# Patient Record
Sex: MALE | Race: WHITE | NOT HISPANIC OR LATINO
[De-identification: names, ages, dates, MRNs, and addresses within clinical notes are randomized per-mention and may not be internally consistent; named-entity substitution may affect disease eponyms.]

---

## 2017-07-31 PROBLEM — Z00.00 ENCOUNTER FOR PREVENTIVE HEALTH EXAMINATION: Status: ACTIVE | Noted: 2017-07-31

## 2017-08-10 ENCOUNTER — NON-APPOINTMENT (OUTPATIENT)
Age: 82
End: 2017-08-10

## 2017-08-10 ENCOUNTER — APPOINTMENT (OUTPATIENT)
Dept: CARDIOLOGY | Facility: CLINIC | Age: 82
End: 2017-08-10
Payer: MEDICARE

## 2017-08-10 VITALS
SYSTOLIC BLOOD PRESSURE: 122 MMHG | OXYGEN SATURATION: 98 % | HEIGHT: 67 IN | BODY MASS INDEX: 39.87 KG/M2 | HEART RATE: 77 BPM | WEIGHT: 254 LBS | DIASTOLIC BLOOD PRESSURE: 75 MMHG

## 2017-08-10 PROCEDURE — 93000 ELECTROCARDIOGRAM COMPLETE: CPT

## 2017-08-10 PROCEDURE — 85610 PROTHROMBIN TIME: CPT | Mod: QW

## 2017-08-10 PROCEDURE — 99204 OFFICE O/P NEW MOD 45 MIN: CPT | Mod: 25

## 2017-08-10 RX ORDER — GABAPENTIN 600 MG/1
600 TABLET, COATED ORAL TWICE DAILY
Refills: 0 | Status: ACTIVE | COMMUNITY

## 2017-08-10 RX ORDER — METFORMIN HYDROCHLORIDE 500 MG/1
500 TABLET, FILM COATED, EXTENDED RELEASE ORAL
Refills: 0 | Status: ACTIVE | COMMUNITY

## 2017-08-10 RX ORDER — CITALOPRAM HYDROBROMIDE 40 MG/1
40 TABLET, FILM COATED ORAL DAILY
Refills: 0 | Status: ACTIVE | COMMUNITY

## 2017-08-10 RX ORDER — WARFARIN 5 MG/1
5 TABLET ORAL
Qty: 30 | Refills: 3 | Status: ACTIVE | COMMUNITY
Start: 2017-08-10 | End: 1900-01-01

## 2017-08-10 RX ORDER — TAMSULOSIN HYDROCHLORIDE 0.4 MG/1
0.4 CAPSULE ORAL
Refills: 0 | Status: ACTIVE | COMMUNITY

## 2017-08-10 RX ORDER — GLIPIZIDE 5 MG/1
5 TABLET, FILM COATED, EXTENDED RELEASE ORAL DAILY
Refills: 0 | Status: ACTIVE | COMMUNITY

## 2017-08-10 RX ORDER — ALPRAZOLAM 0.5 MG/1
0.5 TABLET ORAL
Refills: 0 | Status: DISCONTINUED | COMMUNITY
End: 2017-08-10

## 2017-08-15 LAB — INR PPP: 1.1 RATIO

## 2017-08-17 ENCOUNTER — APPOINTMENT (OUTPATIENT)
Dept: CARDIOLOGY | Facility: CLINIC | Age: 82
End: 2017-08-17
Payer: MEDICARE

## 2017-08-17 VITALS — OXYGEN SATURATION: 98 % | BODY MASS INDEX: 39.24 KG/M2 | HEIGHT: 67 IN | WEIGHT: 250 LBS | HEART RATE: 80 BPM

## 2017-08-17 LAB — INR PPP: 1.9 RATIO

## 2017-08-17 PROCEDURE — 85610 PROTHROMBIN TIME: CPT | Mod: QW

## 2017-08-17 PROCEDURE — 99211 OFF/OP EST MAY X REQ PHY/QHP: CPT

## 2017-08-17 RX ORDER — BLOOD SUGAR DIAGNOSTIC
STRIP MISCELLANEOUS
Qty: 100 | Refills: 2 | Status: ACTIVE | COMMUNITY
Start: 2017-08-17 | End: 1900-01-01

## 2017-09-18 ENCOUNTER — NON-APPOINTMENT (OUTPATIENT)
Age: 82
End: 2017-09-18

## 2017-09-18 ENCOUNTER — APPOINTMENT (OUTPATIENT)
Dept: CARDIOLOGY | Facility: CLINIC | Age: 82
End: 2017-09-18
Payer: MEDICARE

## 2017-09-18 VITALS
SYSTOLIC BLOOD PRESSURE: 100 MMHG | DIASTOLIC BLOOD PRESSURE: 60 MMHG | OXYGEN SATURATION: 98 % | BODY MASS INDEX: 40.02 KG/M2 | WEIGHT: 255 LBS | HEIGHT: 67 IN | HEART RATE: 86 BPM

## 2017-09-18 DIAGNOSIS — F41.9 ANXIETY DISORDER, UNSPECIFIED: ICD-10-CM

## 2017-09-18 PROCEDURE — 85610 PROTHROMBIN TIME: CPT | Mod: QW

## 2017-09-18 PROCEDURE — 99214 OFFICE O/P EST MOD 30 MIN: CPT

## 2017-09-18 PROCEDURE — 93000 ELECTROCARDIOGRAM COMPLETE: CPT

## 2017-09-20 LAB — INR PPP: 3.9 RATIO

## 2017-10-09 ENCOUNTER — APPOINTMENT (OUTPATIENT)
Dept: CARDIOLOGY | Facility: CLINIC | Age: 82
End: 2017-10-09

## 2017-10-25 ENCOUNTER — RX RENEWAL (OUTPATIENT)
Age: 82
End: 2017-10-25

## 2017-11-30 ENCOUNTER — MEDICATION RENEWAL (OUTPATIENT)
Age: 82
End: 2017-11-30

## 2017-12-01 ENCOUNTER — RX RENEWAL (OUTPATIENT)
Age: 82
End: 2017-12-01

## 2018-01-22 ENCOUNTER — RESULT REVIEW (OUTPATIENT)
Age: 83
End: 2018-01-22

## 2018-01-22 ENCOUNTER — RX RENEWAL (OUTPATIENT)
Age: 83
End: 2018-01-22

## 2018-02-23 ENCOUNTER — RX RENEWAL (OUTPATIENT)
Age: 83
End: 2018-02-23

## 2018-02-23 RX ORDER — ALPRAZOLAM 0.5 MG/1
0.5 TABLET ORAL
Qty: 30 | Refills: 0 | Status: ACTIVE | COMMUNITY
Start: 2017-08-10

## 2019-01-16 ENCOUNTER — APPOINTMENT (OUTPATIENT)
Dept: CARDIOLOGY | Facility: CLINIC | Age: 84
End: 2019-01-16
Payer: MEDICARE

## 2019-01-16 ENCOUNTER — NON-APPOINTMENT (OUTPATIENT)
Age: 84
End: 2019-01-16

## 2019-01-16 VITALS
HEART RATE: 68 BPM | WEIGHT: 250 LBS | SYSTOLIC BLOOD PRESSURE: 142 MMHG | OXYGEN SATURATION: 97 % | BODY MASS INDEX: 39.24 KG/M2 | DIASTOLIC BLOOD PRESSURE: 62 MMHG | HEIGHT: 67 IN

## 2019-01-16 DIAGNOSIS — I10 ESSENTIAL (PRIMARY) HYPERTENSION: ICD-10-CM

## 2019-01-16 PROCEDURE — 93306 TTE W/DOPPLER COMPLETE: CPT

## 2019-01-16 PROCEDURE — 93000 ELECTROCARDIOGRAM COMPLETE: CPT

## 2019-01-16 PROCEDURE — 99214 OFFICE O/P EST MOD 30 MIN: CPT

## 2019-01-16 RX ORDER — POTASSIUM CHLORIDE 1.5 G/1
20 POWDER, FOR SOLUTION ORAL DAILY
Refills: 0 | Status: DISCONTINUED | COMMUNITY
End: 2019-01-16

## 2019-01-16 RX ORDER — LISINOPRIL 10 MG/1
10 TABLET ORAL DAILY
Refills: 0 | Status: DISCONTINUED | COMMUNITY
End: 2019-01-16

## 2019-01-16 RX ORDER — ASPIRIN 81 MG/1
81 TABLET, CHEWABLE ORAL
Refills: 0 | Status: DISCONTINUED | COMMUNITY
End: 2019-01-16

## 2019-01-16 NOTE — REVIEW OF SYSTEMS
[Recent Weight Gain (___ Lbs)] : recent [unfilled] ~Ulb weight gain [Palpitations] : palpitations [Dizziness] : dizziness [Negative] : Heme/Lymph

## 2019-01-16 NOTE — DISCUSSION/SUMMARY
[___ Week(s)] : [unfilled] week(s) [FreeTextEntry3] : or sooner if needed. [FreeTextEntry1] : He will continue on atorvastatin for lipid-lowering along with Lasix for edema reduction and continue with his diabetic care medications. He will remain on warfarin with goal INR of 2-3 followed through your office for stroke risk reduction in the setting of chronic atrial fibrillation. I've ordered an echocardiogram to assess his current LV function and valvular status. We'll call him with his test results and he'll follow up with you for care. Age-appropriate activities and low-fat, low-cholesterol, low-salt diet with weight loss over time and carbohydrate restriction was encouraged.

## 2019-01-16 NOTE — HISTORY OF PRESENT ILLNESS
[FreeTextEntry1] : Saud is 89 years of age with obesity, dyslipidemia, hypertension, long-standing atrial fibrillation on warfarin and limited ambulation who follows up in the office feeling generally okay but has some exertional dyspnea at times. For leading generally healthy. He seemed to have a rate-controlled atrial fibrillation on his ECG today. I reviewed recent laboratories and his creatinine elevated slightly and lisinopril is no off his regimen. No current chest pains or palpitations noted.

## 2019-01-16 NOTE — PHYSICAL EXAM
[General Appearance - Well Developed] : well developed [Normal Appearance] : normal appearance [Well Groomed] : well groomed [General Appearance - Well Nourished] : well nourished [No Deformities] : no deformities [General Appearance - In No Acute Distress] : no acute distress [Normal Conjunctiva] : the conjunctiva exhibited no abnormalities [Eyelids - No Xanthelasma] : the eyelids demonstrated no xanthelasmas [Normal Oral Mucosa] : normal oral mucosa [No Oral Pallor] : no oral pallor [No Oral Cyanosis] : no oral cyanosis [Normal Jugular Venous A Waves Present] : normal jugular venous A waves present [Normal Jugular Venous V Waves Present] : normal jugular venous V waves present [No Jugular Venous Etienne A Waves] : no jugular venous etienne A waves [Respiration, Rhythm And Depth] : normal respiratory rhythm and effort [Exaggerated Use Of Accessory Muscles For Inspiration] : no accessory muscle use [Auscultation Breath Sounds / Voice Sounds] : lungs were clear to auscultation bilaterally [Heart Rate And Rhythm] : heart rate and rhythm were normal [Heart Sounds] : normal S1 and S2 [Murmurs] : no murmurs present [Edema] : no peripheral edema present [1+] : left 1+ [Bowel Sounds] : normal bowel sounds [Abdomen Soft] : soft [Abdomen Tenderness] : non-tender [Abdomen Mass (___ Cm)] : no abdominal mass palpated [Nail Clubbing] : no clubbing of the fingernails [Cyanosis, Localized] : no localized cyanosis [Petechial Hemorrhages (___cm)] : no petechial hemorrhages [Skin Color & Pigmentation] : normal skin color and pigmentation [] : no rash [No Venous Stasis] : no venous stasis [Skin Lesions] : no skin lesions [No Skin Ulcers] : no skin ulcer [No Xanthoma] : no  xanthoma was observed [Oriented To Time, Place, And Person] : oriented to person, place, and time [Affect] : the affect was normal [Mood] : the mood was normal [No Anxiety] : not feeling anxious [Right Carotid Bruit] : no bruit heard over the right carotid [Left Carotid Bruit] : no bruit heard over the left carotid [Bruit] : no bruit heard [FreeTextEntry1] : Walks with a cane

## 2019-01-16 NOTE — REASON FOR VISIT
[Follow-Up - Clinic] : a clinic follow-up of [Atrial Fibrillation] : atrial fibrillation [Dyspnea] : dyspnea [Hyperlipidemia] : hyperlipidemia [Hypertension] : hypertension

## 2020-02-05 ENCOUNTER — APPOINTMENT (OUTPATIENT)
Dept: CARDIOLOGY | Facility: CLINIC | Age: 85
End: 2020-02-05
Payer: MEDICARE

## 2020-02-05 ENCOUNTER — NON-APPOINTMENT (OUTPATIENT)
Age: 85
End: 2020-02-05

## 2020-02-05 VITALS
OXYGEN SATURATION: 94 % | HEART RATE: 59 BPM | BODY MASS INDEX: 40.18 KG/M2 | WEIGHT: 256 LBS | HEIGHT: 67 IN | DIASTOLIC BLOOD PRESSURE: 60 MMHG | SYSTOLIC BLOOD PRESSURE: 141 MMHG

## 2020-02-05 DIAGNOSIS — Z86.39 PERSONAL HISTORY OF OTHER ENDOCRINE, NUTRITIONAL AND METABOLIC DISEASE: ICD-10-CM

## 2020-02-05 PROCEDURE — 93225 XTRNL ECG REC<48 HRS REC: CPT

## 2020-02-05 PROCEDURE — 99215 OFFICE O/P EST HI 40 MIN: CPT

## 2020-02-05 PROCEDURE — 93306 TTE W/DOPPLER COMPLETE: CPT

## 2020-02-05 PROCEDURE — 93000 ELECTROCARDIOGRAM COMPLETE: CPT

## 2020-02-05 RX ORDER — AMLODIPINE BESYLATE 5 MG/1
5 TABLET ORAL DAILY
Qty: 90 | Refills: 3 | Status: ACTIVE | COMMUNITY
Start: 2020-02-05

## 2020-02-05 RX ORDER — PIOGLITAZONE HYDROCHLORIDE 15 MG/1
15 TABLET ORAL DAILY
Refills: 0 | Status: ACTIVE | COMMUNITY
Start: 2020-02-05

## 2020-02-05 NOTE — REASON FOR VISIT
[Follow-Up - Clinic] : a clinic follow-up of [Dyspnea] : dyspnea [Atrial Fibrillation] : atrial fibrillation [Hypertension] : hypertension [Hyperlipidemia] : hyperlipidemia

## 2020-02-05 NOTE — HISTORY OF PRESENT ILLNESS
[FreeTextEntry1] : Saud is 89 years of age with hypertension, atrial fibrillation, obesity, dyslipidemia, exertional dyspnea and went to [facility] recently where he stayed for several days with an unclear workup. He is here today with his adult daughter who helps try to explain what was done which sounds like blood work, x-ray and telemetry monitoring. Suggestion of sick sinus syndrome however he declined pursuing further testing is here for cardiac followup. Had adjustment of medication for ankle edema or he was given extra dosing of diuretic.

## 2020-02-05 NOTE — REVIEW OF SYSTEMS
[Recent Weight Gain (___ Lbs)] : recent [unfilled] ~Ulb weight gain [Dizziness] : dizziness [Palpitations] : palpitations [Negative] : Heme/Lymph

## 2020-02-05 NOTE — DISCUSSION/SUMMARY
[___ Week(s)] : [unfilled] week(s) [FreeTextEntry1] : He is advised  to continue his Lasix 40 mg daily along with his back on glycemic controlling medications antihypertensive medication management along with lipid lowering with a statin. To further cardiac risk stratify, I have ordered an echocardiogram to assess LV function and valvular status.I've ordered a 24-hour Holter monitor to assess for dysrhythmias including bradycardia arrhythmias or tachycardia arrhythmias.We will call the patient with test results and followup with you for general care.

## 2020-02-05 NOTE — PHYSICAL EXAM
[General Appearance - Well Developed] : well developed [Normal Appearance] : normal appearance [Well Groomed] : well groomed [No Deformities] : no deformities [General Appearance - Well Nourished] : well nourished [General Appearance - In No Acute Distress] : no acute distress [Normal Conjunctiva] : the conjunctiva exhibited no abnormalities [Eyelids - No Xanthelasma] : the eyelids demonstrated no xanthelasmas [No Oral Pallor] : no oral pallor [Normal Oral Mucosa] : normal oral mucosa [Normal Jugular Venous A Waves Present] : normal jugular venous A waves present [No Oral Cyanosis] : no oral cyanosis [Normal Jugular Venous V Waves Present] : normal jugular venous V waves present [No Jugular Venous Etienne A Waves] : no jugular venous etienne A waves [Respiration, Rhythm And Depth] : normal respiratory rhythm and effort [Auscultation Breath Sounds / Voice Sounds] : lungs were clear to auscultation bilaterally [Exaggerated Use Of Accessory Muscles For Inspiration] : no accessory muscle use [Murmurs] : no murmurs present [Heart Sounds] : normal S1 and S2 [1+] : left 1+ [Bowel Sounds] : normal bowel sounds [Abdomen Soft] : soft [Abdomen Mass (___ Cm)] : no abdominal mass palpated [Abdomen Tenderness] : non-tender [Nail Clubbing] : no clubbing of the fingernails [Cyanosis, Localized] : no localized cyanosis [Petechial Hemorrhages (___cm)] : no petechial hemorrhages [Skin Color & Pigmentation] : normal skin color and pigmentation [] : no rash [No Venous Stasis] : no venous stasis [Skin Lesions] : no skin lesions [No Skin Ulcers] : no skin ulcer [No Xanthoma] : no  xanthoma was observed [Oriented To Time, Place, And Person] : oriented to person, place, and time [Affect] : the affect was normal [Mood] : the mood was normal [No Anxiety] : not feeling anxious [Bradycardic ___] : the heart rate was bradycardic at [unfilled] bpm [Irregularly Irregular] : the rhythm was irregularly irregular [Right Carotid Bruit] : no bruit heard over the right carotid [Bruit] : no bruit heard [Left Carotid Bruit] : no bruit heard over the left carotid [FreeTextEntry1] : Walks with a cane

## 2020-02-14 ENCOUNTER — APPOINTMENT (OUTPATIENT)
Dept: ELECTROPHYSIOLOGY | Facility: CLINIC | Age: 85
End: 2020-02-14
Payer: MEDICARE

## 2020-02-14 ENCOUNTER — NON-APPOINTMENT (OUTPATIENT)
Age: 85
End: 2020-02-14

## 2020-02-14 VITALS
HEIGHT: 67 IN | SYSTOLIC BLOOD PRESSURE: 155 MMHG | WEIGHT: 256 LBS | DIASTOLIC BLOOD PRESSURE: 75 MMHG | OXYGEN SATURATION: 94 % | RESPIRATION RATE: 16 BRPM | HEART RATE: 67 BPM | BODY MASS INDEX: 40.18 KG/M2

## 2020-02-14 DIAGNOSIS — Z79.01 LONG TERM (CURRENT) USE OF ANTICOAGULANTS: ICD-10-CM

## 2020-02-14 DIAGNOSIS — I49.5 SICK SINUS SYNDROME: ICD-10-CM

## 2020-02-14 LAB
ALBUMIN SERPL ELPH-MCNC: 4 G/DL
ALP BLD-CCNC: 69 U/L
ALT SERPL-CCNC: 13 U/L
ANION GAP SERPL CALC-SCNC: 14 MMOL/L
AST SERPL-CCNC: 13 U/L
BASOPHILS # BLD AUTO: 0.01 K/UL
BASOPHILS NFR BLD AUTO: 0.2 %
BILIRUB SERPL-MCNC: 0.4 MG/DL
BUN SERPL-MCNC: 51 MG/DL
CALCIUM SERPL-MCNC: 9.5 MG/DL
CHLORIDE SERPL-SCNC: 102 MMOL/L
CO2 SERPL-SCNC: 25 MMOL/L
CREAT SERPL-MCNC: 1.68 MG/DL
EOSINOPHIL # BLD AUTO: 0.07 K/UL
EOSINOPHIL NFR BLD AUTO: 1.5 %
GLUCOSE SERPL-MCNC: 179 MG/DL
HCT VFR BLD CALC: 34.5 %
HGB BLD-MCNC: 10.4 G/DL
IMM GRANULOCYTES NFR BLD AUTO: 0.6 %
LYMPHOCYTES # BLD AUTO: 0.92 K/UL
LYMPHOCYTES NFR BLD AUTO: 19.9 %
MAN DIFF?: NORMAL
MCHC RBC-ENTMCNC: 27.8 PG
MCHC RBC-ENTMCNC: 30.1 GM/DL
MCV RBC AUTO: 92.2 FL
MONOCYTES # BLD AUTO: 0.52 K/UL
MONOCYTES NFR BLD AUTO: 11.2 %
NEUTROPHILS # BLD AUTO: 3.08 K/UL
NEUTROPHILS NFR BLD AUTO: 66.6 %
PLATELET # BLD AUTO: 175 K/UL
POTASSIUM SERPL-SCNC: 4.7 MMOL/L
PROT SERPL-MCNC: 7.3 G/DL
RBC # BLD: 3.74 M/UL
RBC # FLD: 17.2 %
SODIUM SERPL-SCNC: 141 MMOL/L
WBC # FLD AUTO: 4.63 K/UL

## 2020-02-14 PROCEDURE — 99205 OFFICE O/P NEW HI 60 MIN: CPT

## 2020-02-14 PROCEDURE — 93000 ELECTROCARDIOGRAM COMPLETE: CPT

## 2020-02-15 NOTE — PHYSICAL EXAM
[General Appearance - Well Developed] : well developed [Normal Appearance] : normal appearance [Well Groomed] : well groomed [General Appearance - Well Nourished] : well nourished [No Deformities] : no deformities [General Appearance - In No Acute Distress] : no acute distress [Normal Conjunctiva] : the conjunctiva exhibited no abnormalities [Eyelids - No Xanthelasma] : the eyelids demonstrated no xanthelasmas [Normal Oral Mucosa] : normal oral mucosa [No Oral Pallor] : no oral pallor [No Oral Cyanosis] : no oral cyanosis [Normal Jugular Venous A Waves Present] : normal jugular venous A waves present [Normal Jugular Venous V Waves Present] : normal jugular venous V waves present [No Jugular Venous Etienne A Waves] : no jugular venous etienne A waves [Heart Sounds] : normal S1 and S2 [Heart Rate And Rhythm] : heart rate and rhythm were normal [Murmurs] : no murmurs present [Respiration, Rhythm And Depth] : normal respiratory rhythm and effort [Exaggerated Use Of Accessory Muscles For Inspiration] : no accessory muscle use [Abdomen Soft] : soft [Abdomen Tenderness] : non-tender [Abdomen Mass (___ Cm)] : no abdominal mass palpated [Abnormal Walk] : normal gait [Nail Clubbing] : no clubbing of the fingernails [Gait - Sufficient For Exercise Testing] : the gait was sufficient for exercise testing [Petechial Hemorrhages (___cm)] : no petechial hemorrhages [Cyanosis, Localized] : no localized cyanosis [Skin Color & Pigmentation] : normal skin color and pigmentation [] : no rash [No Venous Stasis] : no venous stasis [Skin Lesions] : no skin lesions [No Skin Ulcers] : no skin ulcer [No Xanthoma] : no  xanthoma was observed [Mood] : the mood was normal [Oriented To Time, Place, And Person] : oriented to person, place, and time [Affect] : the affect was normal [No Anxiety] : not feeling anxious [FreeTextEntry1] : B/L diminished/crackles

## 2020-02-15 NOTE — HISTORY OF PRESENT ILLNESS
[FreeTextEntry1] : Ferny Quinteros MD \par \par Saud Benitez is a 89y/o man with Hx of HTN, HLD, BPH, DMII, and sick sinus syndrome, pending pacemaker implant, who presents today for initial evaluation. Has episodes of feeling dizzy and is often very tired. Was seen by his Cardiologist and noted to have marked bradycardia on EKG. Underwent Holter monitoring which revealed sick sinus syndrome. Denies chest pain, palpitations, SOB, syncope or near syncope.\par

## 2020-02-15 NOTE — DISCUSSION/SUMMARY
[FreeTextEntry1] : Saud Benitez is a 89y/o man with Hx of HTN, HLD, BPH, DMII, and sick sinus syndrome, pending pacemaker implant, who presents today for initial evaluation. \par \par Impression:\par \par 1. Sick sinus syndrome: EKG today sinus bradycardia with first degree AV delay and apc. Given sick sinus syndrome, recommend placement of dual chamber PPM. Risks, benefits, and alternatives discussed. \par \par 2. HTN: resume oral antihypertensives as prescribed. Encouraged heart healthy diet, sodium restriction, and weight loss. Continue regular f/u with Cardiologist for further HTN management.\par \par 3. HLD: resume statin therapy as prescribed and regular f/u with Cardiologist for routine lipid monitoring and management.\par \par Will f/u post pacemaker implant. \par \par Sincerely,\par \par Bernard Gonzalez MD

## 2020-02-18 LAB
INR PPP: 2.29 RATIO
PT BLD: 26.6 SEC

## 2020-02-27 ENCOUNTER — INPATIENT (INPATIENT)
Facility: HOSPITAL | Age: 85
LOS: 0 days | Discharge: HOME | End: 2020-02-28
Attending: INTERNAL MEDICINE | Admitting: INTERNAL MEDICINE
Payer: MEDICARE

## 2020-02-27 VITALS
SYSTOLIC BLOOD PRESSURE: 137 MMHG | RESPIRATION RATE: 16 BRPM | OXYGEN SATURATION: 96 % | DIASTOLIC BLOOD PRESSURE: 78 MMHG | HEART RATE: 101 BPM | TEMPERATURE: 98 F

## 2020-02-27 DIAGNOSIS — I49.5 SICK SINUS SYNDROME: ICD-10-CM

## 2020-02-27 LAB
GLUCOSE BLDC GLUCOMTR-MCNC: 136 MG/DL — HIGH (ref 70–99)
GLUCOSE BLDC GLUCOMTR-MCNC: 287 MG/DL — HIGH (ref 70–99)
INR BLD: 1.64 — HIGH (ref 0.88–1.17)
PROTHROM AB SERPL-ACNC: 19 SEC — HIGH (ref 9.8–13.1)

## 2020-02-27 PROCEDURE — 71045 X-RAY EXAM CHEST 1 VIEW: CPT | Mod: 26

## 2020-02-27 PROCEDURE — 93010 ELECTROCARDIOGRAM REPORT: CPT | Mod: 59

## 2020-02-27 PROCEDURE — 33208 INSRT HEART PM ATRIAL & VENT: CPT | Mod: KX

## 2020-02-27 RX ORDER — ATORVASTATIN CALCIUM 80 MG/1
20 TABLET, FILM COATED ORAL AT BEDTIME
Refills: 0 | Status: DISCONTINUED | OUTPATIENT
Start: 2020-02-27 | End: 2020-02-28

## 2020-02-27 RX ORDER — GABAPENTIN 400 MG/1
600 CAPSULE ORAL
Refills: 0 | Status: DISCONTINUED | OUTPATIENT
Start: 2020-02-27 | End: 2020-02-28

## 2020-02-27 RX ORDER — FUROSEMIDE 40 MG
1 TABLET ORAL
Qty: 0 | Refills: 0 | DISCHARGE

## 2020-02-27 RX ORDER — DEXTROSE 50 % IN WATER 50 %
15 SYRINGE (ML) INTRAVENOUS ONCE
Refills: 0 | Status: DISCONTINUED | OUTPATIENT
Start: 2020-02-27 | End: 2020-02-28

## 2020-02-27 RX ORDER — INSULIN LISPRO 100/ML
VIAL (ML) SUBCUTANEOUS AT BEDTIME
Refills: 0 | Status: DISCONTINUED | OUTPATIENT
Start: 2020-02-27 | End: 2020-02-28

## 2020-02-27 RX ORDER — DEXTROSE 50 % IN WATER 50 %
25 SYRINGE (ML) INTRAVENOUS ONCE
Refills: 0 | Status: DISCONTINUED | OUTPATIENT
Start: 2020-02-27 | End: 2020-02-28

## 2020-02-27 RX ORDER — FUROSEMIDE 40 MG
80 TABLET ORAL DAILY
Refills: 0 | Status: DISCONTINUED | OUTPATIENT
Start: 2020-02-27 | End: 2020-02-27

## 2020-02-27 RX ORDER — CHOLECALCIFEROL (VITAMIN D3) 125 MCG
1 CAPSULE ORAL
Qty: 0 | Refills: 0 | DISCHARGE

## 2020-02-27 RX ORDER — PIOGLITAZONE HYDROCHLORIDE 15 MG/1
1 TABLET ORAL
Qty: 0 | Refills: 0 | DISCHARGE

## 2020-02-27 RX ORDER — GABAPENTIN 400 MG/1
1 CAPSULE ORAL
Qty: 0 | Refills: 0 | DISCHARGE

## 2020-02-27 RX ORDER — WARFARIN SODIUM 2.5 MG/1
1 TABLET ORAL
Qty: 0 | Refills: 0 | DISCHARGE

## 2020-02-27 RX ORDER — CEFAZOLIN SODIUM 1 G
1000 VIAL (EA) INJECTION EVERY 8 HOURS
Refills: 0 | Status: COMPLETED | OUTPATIENT
Start: 2020-02-27 | End: 2020-02-28

## 2020-02-27 RX ORDER — FOLIC ACID 0.8 MG
1 TABLET ORAL DAILY
Refills: 0 | Status: DISCONTINUED | OUTPATIENT
Start: 2020-02-27 | End: 2020-02-28

## 2020-02-27 RX ORDER — FINASTERIDE 5 MG/1
1 TABLET, FILM COATED ORAL
Qty: 0 | Refills: 0 | DISCHARGE

## 2020-02-27 RX ORDER — ATORVASTATIN CALCIUM 80 MG/1
1 TABLET, FILM COATED ORAL
Qty: 0 | Refills: 0 | DISCHARGE

## 2020-02-27 RX ORDER — GLUCAGON INJECTION, SOLUTION 0.5 MG/.1ML
1 INJECTION, SOLUTION SUBCUTANEOUS ONCE
Refills: 0 | Status: DISCONTINUED | OUTPATIENT
Start: 2020-02-27 | End: 2020-02-28

## 2020-02-27 RX ORDER — FOLIC ACID 0.8 MG
1 TABLET ORAL
Qty: 0 | Refills: 0 | DISCHARGE

## 2020-02-27 RX ORDER — FINASTERIDE 5 MG/1
5 TABLET, FILM COATED ORAL DAILY
Refills: 0 | Status: DISCONTINUED | OUTPATIENT
Start: 2020-02-27 | End: 2020-02-28

## 2020-02-27 RX ORDER — FUROSEMIDE 40 MG
40 TABLET ORAL EVERY 12 HOURS
Refills: 0 | Status: COMPLETED | OUTPATIENT
Start: 2020-02-27 | End: 2020-02-28

## 2020-02-27 RX ORDER — PREGABALIN 225 MG/1
1 CAPSULE ORAL
Qty: 0 | Refills: 0 | DISCHARGE

## 2020-02-27 RX ORDER — WARFARIN SODIUM 2.5 MG/1
5 TABLET ORAL ONCE
Refills: 0 | Status: COMPLETED | OUTPATIENT
Start: 2020-02-27 | End: 2020-02-27

## 2020-02-27 RX ORDER — CITALOPRAM 10 MG/1
0.5 TABLET, FILM COATED ORAL
Qty: 0 | Refills: 0 | DISCHARGE

## 2020-02-27 RX ORDER — TAMSULOSIN HYDROCHLORIDE 0.4 MG/1
1 CAPSULE ORAL
Qty: 0 | Refills: 0 | DISCHARGE

## 2020-02-27 RX ORDER — DEXTROSE 50 % IN WATER 50 %
12.5 SYRINGE (ML) INTRAVENOUS ONCE
Refills: 0 | Status: DISCONTINUED | OUTPATIENT
Start: 2020-02-27 | End: 2020-02-28

## 2020-02-27 RX ORDER — FUROSEMIDE 40 MG
80 TABLET ORAL EVERY 24 HOURS
Refills: 0 | Status: DISCONTINUED | OUTPATIENT
Start: 2020-02-28 | End: 2020-02-28

## 2020-02-27 RX ORDER — LANOLIN ALCOHOL/MO/W.PET/CERES
3 CREAM (GRAM) TOPICAL AT BEDTIME
Refills: 0 | Status: DISCONTINUED | OUTPATIENT
Start: 2020-02-27 | End: 2020-02-28

## 2020-02-27 RX ORDER — WARFARIN SODIUM 2.5 MG/1
0.5 TABLET ORAL
Qty: 0 | Refills: 0 | DISCHARGE

## 2020-02-27 RX ORDER — CITALOPRAM 10 MG/1
20 TABLET, FILM COATED ORAL DAILY
Refills: 0 | Status: DISCONTINUED | OUTPATIENT
Start: 2020-02-27 | End: 2020-02-28

## 2020-02-27 RX ORDER — SODIUM CHLORIDE 9 MG/ML
1000 INJECTION, SOLUTION INTRAVENOUS
Refills: 0 | Status: DISCONTINUED | OUTPATIENT
Start: 2020-02-27 | End: 2020-02-28

## 2020-02-27 RX ORDER — FUROSEMIDE 40 MG
40 TABLET ORAL ONCE
Refills: 0 | Status: COMPLETED | OUTPATIENT
Start: 2020-02-27 | End: 2020-02-27

## 2020-02-27 RX ORDER — TAMSULOSIN HYDROCHLORIDE 0.4 MG/1
0.4 CAPSULE ORAL AT BEDTIME
Refills: 0 | Status: DISCONTINUED | OUTPATIENT
Start: 2020-02-27 | End: 2020-02-28

## 2020-02-27 RX ORDER — INSULIN LISPRO 100/ML
VIAL (ML) SUBCUTANEOUS
Refills: 0 | Status: DISCONTINUED | OUTPATIENT
Start: 2020-02-27 | End: 2020-02-28

## 2020-02-27 RX ORDER — ALPRAZOLAM 0.25 MG
1 TABLET ORAL
Qty: 0 | Refills: 0 | DISCHARGE

## 2020-02-27 RX ORDER — SODIUM CHLORIDE 9 MG/ML
3 INJECTION INTRAMUSCULAR; INTRAVENOUS; SUBCUTANEOUS EVERY 8 HOURS
Refills: 0 | Status: DISCONTINUED | OUTPATIENT
Start: 2020-02-27 | End: 2020-02-28

## 2020-02-27 RX ADMIN — Medication 1 MILLIGRAM(S): at 22:57

## 2020-02-27 RX ADMIN — Medication 3 MILLIGRAM(S): at 22:57

## 2020-02-27 RX ADMIN — ATORVASTATIN CALCIUM 20 MILLIGRAM(S): 80 TABLET, FILM COATED ORAL at 22:57

## 2020-02-27 RX ADMIN — Medication 40 MILLIGRAM(S): at 13:26

## 2020-02-27 RX ADMIN — Medication 40 MILLIGRAM(S): at 22:56

## 2020-02-27 RX ADMIN — Medication 100 MILLIGRAM(S): at 23:00

## 2020-02-27 RX ADMIN — CITALOPRAM 20 MILLIGRAM(S): 10 TABLET, FILM COATED ORAL at 22:57

## 2020-02-27 RX ADMIN — SODIUM CHLORIDE 3 MILLILITER(S): 9 INJECTION INTRAMUSCULAR; INTRAVENOUS; SUBCUTANEOUS at 21:42

## 2020-02-27 RX ADMIN — WARFARIN SODIUM 5 MILLIGRAM(S): 2.5 TABLET ORAL at 22:57

## 2020-02-27 RX ADMIN — FINASTERIDE 5 MILLIGRAM(S): 5 TABLET, FILM COATED ORAL at 22:57

## 2020-02-27 RX ADMIN — TAMSULOSIN HYDROCHLORIDE 0.4 MILLIGRAM(S): 0.4 CAPSULE ORAL at 22:57

## 2020-02-27 NOTE — H&P CARDIOLOGY - HISTORY OF PRESENT ILLNESS
89 y/o M w/ PMH of HTN, HLD, BPH, DMII and sick sinus syndrome presents for elective PPM. Has episodes of feeling dizzy and is often very tired. Was seen by his Cardiologist and noted to have marked bradycardia on EKG. Underwent Holter monitoring which revealed sick sinus syndrome. See hard copy H&P from 2/14/20 in chart.

## 2020-02-27 NOTE — CHART NOTE - NSCHARTNOTEFT_GEN_A_CORE
Patient seen and examined   T(C): 36.4 (02-27-20 @ 20:01), Max: 36.4 (02-27-20 @ 20:01)  HR: 66 (02-27-20 @ 20:01) (66 - 66)  BP: 137/78 (02-27-20 @ 20:01) (137/78 - 137/78)  RR: 16 (02-27-20 @ 20:01) (16 - 16)  SpO2: 96% (02-27-20 @ 20:01) (96% - 96%)    Patient s/p PPM implant.   Patient denies any complaints. Patient was requesting medication for insomnia  Left subclavicular incision site stable. Dressing clean, dry and intact. No hematoma or active bleeding noted.     CXR s/p implant - prelim - No PTX  - official results to follow    Patient hemodynamically stable will continue to monitor

## 2020-02-27 NOTE — CHART NOTE - NSCHARTNOTEFT_GEN_A_CORE
Type of Procedure: Implantation of dual chamber pacemaker  Licensed independent practitioner: Bernard Gonzalez MD  Assistant: none  Description of procedure: sterile conditions, antibiotic coverage, subclavian venous access, ventricular and atrial lead implanted, prepectoral pocket copiously irrigated with antibiotic solution and closed in 3 layers  Findings of procedure: normal pacing, sensing and lead integrity  Estimated blood loss: < 10 cc  Specimen removed: none  Preoperative Dx: paroxysmal atrial fibrillation; sinus bradycardia; tachy elizabeth syndrome  Postoperative Dx: paroxysmal atrial fibrillation; sinus bradycardia; tachy elizabeth syndrome  Complications: none  Anesthesia type: local with sedation  No heparin for 24 hours and then reassess.    Bernard Gonzalez MD.

## 2020-02-27 NOTE — H&P CARDIOLOGY - NEGATIVE CARDIOVASCULAR SYMPTOMS
no paroxysmal nocturnal dyspnea/no orthopnea/no peripheral edema/no claudication/no chest pain/no palpitations/no dyspnea on exertion

## 2020-02-27 NOTE — H&P CARDIOLOGY - PMH
BPH (benign prostatic hyperplasia)    HLD (hyperlipidemia)    HTN (hypertension)    Sick sinus syndrome    Symptomatic bradycardia

## 2020-02-27 NOTE — H&P CARDIOLOGY - NEGATIVE NEUROLOGICAL SYMPTOMS
no loss of sensation/no difficulty walking/no loss of consciousness/no focal seizures/no transient paralysis/no generalized seizures/no hemiparesis/no facial palsy/no headache/no syncope/no tremors/no paresthesias/no confusion

## 2020-02-27 NOTE — H&P CARDIOLOGY - RS GEN PE MLT RESP DETAILS PC
airway patent/good air movement/respirations non-labored/clear to auscultation bilaterally/breath sounds equal/no chest wall tenderness

## 2020-02-28 ENCOUNTER — TRANSCRIPTION ENCOUNTER (OUTPATIENT)
Age: 85
End: 2020-02-28

## 2020-02-28 VITALS
OXYGEN SATURATION: 100 % | HEART RATE: 76 BPM | DIASTOLIC BLOOD PRESSURE: 68 MMHG | TEMPERATURE: 98 F | SYSTOLIC BLOOD PRESSURE: 138 MMHG | RESPIRATION RATE: 17 BRPM

## 2020-02-28 DIAGNOSIS — E78.5 HYPERLIPIDEMIA, UNSPECIFIED: ICD-10-CM

## 2020-02-28 DIAGNOSIS — N40.0 BENIGN PROSTATIC HYPERPLASIA WITHOUT LOWER URINARY TRACT SYMPTOMS: ICD-10-CM

## 2020-02-28 DIAGNOSIS — E11.9 TYPE 2 DIABETES MELLITUS WITHOUT COMPLICATIONS: ICD-10-CM

## 2020-02-28 DIAGNOSIS — I10 ESSENTIAL (PRIMARY) HYPERTENSION: ICD-10-CM

## 2020-02-28 LAB
ANION GAP SERPL CALC-SCNC: 11 MMO/L — SIGNIFICANT CHANGE UP (ref 7–14)
BUN SERPL-MCNC: 49 MG/DL — HIGH (ref 7–23)
CALCIUM SERPL-MCNC: 8.9 MG/DL — SIGNIFICANT CHANGE UP (ref 8.4–10.5)
CHLORIDE SERPL-SCNC: 97 MMOL/L — LOW (ref 98–107)
CO2 SERPL-SCNC: 27 MMOL/L — SIGNIFICANT CHANGE UP (ref 22–31)
CREAT SERPL-MCNC: 1.76 MG/DL — HIGH (ref 0.5–1.3)
GLUCOSE BLDC GLUCOMTR-MCNC: 151 MG/DL — HIGH (ref 70–99)
GLUCOSE BLDC GLUCOMTR-MCNC: 159 MG/DL — HIGH (ref 70–99)
GLUCOSE SERPL-MCNC: 144 MG/DL — HIGH (ref 70–99)
HCT VFR BLD CALC: 32 % — LOW (ref 39–50)
HGB BLD-MCNC: 9.5 G/DL — LOW (ref 13–17)
INR BLD: 1.68 — HIGH (ref 0.88–1.17)
MAGNESIUM SERPL-MCNC: 2.2 MG/DL — SIGNIFICANT CHANGE UP (ref 1.6–2.6)
MCHC RBC-ENTMCNC: 27.4 PG — SIGNIFICANT CHANGE UP (ref 27–34)
MCHC RBC-ENTMCNC: 29.7 % — LOW (ref 32–36)
MCV RBC AUTO: 92.2 FL — SIGNIFICANT CHANGE UP (ref 80–100)
NRBC # FLD: 0 K/UL — SIGNIFICANT CHANGE UP (ref 0–0)
PHOSPHATE SERPL-MCNC: 3 MG/DL — SIGNIFICANT CHANGE UP (ref 2.5–4.5)
PLATELET # BLD AUTO: 163 K/UL — SIGNIFICANT CHANGE UP (ref 150–400)
PMV BLD: 10.6 FL — SIGNIFICANT CHANGE UP (ref 7–13)
POTASSIUM SERPL-MCNC: 4.7 MMOL/L — SIGNIFICANT CHANGE UP (ref 3.5–5.3)
POTASSIUM SERPL-SCNC: 4.7 MMOL/L — SIGNIFICANT CHANGE UP (ref 3.5–5.3)
PROTHROM AB SERPL-ACNC: 19.5 SEC — HIGH (ref 9.8–13.1)
RBC # BLD: 3.47 M/UL — LOW (ref 4.2–5.8)
RBC # FLD: 16.6 % — HIGH (ref 10.3–14.5)
SODIUM SERPL-SCNC: 135 MMOL/L — SIGNIFICANT CHANGE UP (ref 135–145)
WBC # BLD: 4.84 K/UL — SIGNIFICANT CHANGE UP (ref 3.8–10.5)
WBC # FLD AUTO: 4.84 K/UL — SIGNIFICANT CHANGE UP (ref 3.8–10.5)

## 2020-02-28 RX ORDER — WARFARIN SODIUM 2.5 MG/1
2.5 TABLET ORAL ONCE
Refills: 0 | Status: DISCONTINUED | OUTPATIENT
Start: 2020-02-28 | End: 2020-02-28

## 2020-02-28 RX ORDER — APIXABAN 2.5 MG/1
1 TABLET, FILM COATED ORAL
Qty: 60 | Refills: 0
Start: 2020-02-28 | End: 2020-03-28

## 2020-02-28 RX ADMIN — SODIUM CHLORIDE 3 MILLILITER(S): 9 INJECTION INTRAMUSCULAR; INTRAVENOUS; SUBCUTANEOUS at 14:00

## 2020-02-28 RX ADMIN — Medication 100 MILLIGRAM(S): at 06:46

## 2020-02-28 RX ADMIN — SODIUM CHLORIDE 3 MILLILITER(S): 9 INJECTION INTRAMUSCULAR; INTRAVENOUS; SUBCUTANEOUS at 06:26

## 2020-02-28 RX ADMIN — Medication 40 MILLIGRAM(S): at 11:36

## 2020-02-28 RX ADMIN — SODIUM CHLORIDE 3 MILLILITER(S): 9 INJECTION INTRAMUSCULAR; INTRAVENOUS; SUBCUTANEOUS at 14:38

## 2020-02-28 NOTE — DISCHARGE NOTE PROVIDER - CARE PROVIDER_API CALL
Detweiler, Allan S (DO)  Family Medicine  [address] [place] [id number]  Phone: [phone]  Fax: [phone]  Follow Up Time:

## 2020-02-28 NOTE — DISCHARGE NOTE PROVIDER - NSDCFUSCHEDAPPT_GEN_ALL_CORE_FT
WILLIAM STAFFORD ; 03/17/2020 ; [facility] [id number]  WILLIAM STAFFORD ; 05/13/2020 ; [facility] [address]

## 2020-02-28 NOTE — PROGRESS NOTE ADULT - SUBJECTIVE AND OBJECTIVE BOX
ANESTHESIA POSTOP CHECK    89y Male POSTOP DAY 1 S/P   [ ] General Anesthesia  [ ] Isaiah Anesthesia  [x ] MAC    Vital Signs Last 24 Hrs  T(C): 36.7 (28 Feb 2020 11:14), Max: 36.7 (28 Feb 2020 11:14)  T(F): 98 (28 Feb 2020 11:14), Max: 98 (28 Feb 2020 11:14)  HR: 76 (28 Feb 2020 11:14) (66 - 81)  BP: 138/68 (28 Feb 2020 11:14) (137/78 - 146/79)  BP(mean): --  RR: 17 (28 Feb 2020 11:14) (16 - 17)  SpO2: 100% (28 Feb 2020 11:14) (90% - 100%)  I&O's Summary    27 Feb 2020 07:01  -  28 Feb 2020 07:00  --------------------------------------------------------  IN: 300 mL / OUT: 950 mL / NET: -650 mL        [x ] NO APPARENT ANESTHESIA COMPLICATIONS      Comments:

## 2020-02-28 NOTE — DISCHARGE NOTE NURSING/CASE MANAGEMENT/SOCIAL WORK - NSDCPEPTCOWAR_GEN_ALL_CORE
Warfarin/Coumadin - Potential for adverse drug reactions and interactions/Warfarin/Coumadin - Follow up monitoring/Warfarin/Coumadin - Compliance/Warfarin/Coumadin - Dietary Advice

## 2020-02-28 NOTE — PROGRESS NOTE ADULT - SUBJECTIVE AND OBJECTIVE BOX
S/p PPM by Shavonne Gonzalez   S/24 Events: Patient seen and examined. Denies CP, SOB, dizziness, LH, near syncope or sycope.   No acute events overnight.   INR 1.6    Telemetry : -AV sequential pacing 60's. No events overnight   O:  T(C): 36.7 (02-28-20 @ 11:14), Max: 36.7 (02-28-20 @ 11:14)  HR: 76 (02-28-20 @ 11:14) (66 - 81)  BP: 138/68 (02-28-20 @ 11:14) (137/78 - 146/79)  RR: 17 (02-28-20 @ 11:14) (16 - 17)  SpO2: 100% (02-28-20 @ 11:14) (90% - 100%)  Wt(kg): --  Daily     Daily   Gen: NAD  HEENT: EOMI  CV: RRR, normal S1 + S2, no m/r/g  Lungs: CTAB  Abd: soft, non-tender  Ext: 2+ edema    Labs:                        9.5    4.84  )-----------( 163      ( 28 Feb 2020 06:50 )             32.0     02-28    135  |  97<L>  |  49<H>  ----------------------------<  144<H>  4.7   |  27  |  1.76<H>    Ca    8.9      28 Feb 2020 06:50  Phos  3.0     02-28  Mg     2.2     02-28      PT/INR - ( 28 Feb 2020 06:50 )   PT: 19.5 SEC;   INR: 1.68          CXR - < from: Xray Chest 1 View-PORTABLE IMMEDIATE (02.27.20 @ 18:16) >  Small bilateral pleural effusions. Patchy bilateral airspace disease, right greater than left noted. No pneumothorax.   Cardiac size cannot be accurately assessed in this projection, however appears enlarged. No acute osseous abnormality.    IMPRESSION:   No pneumothorax.     Patchy bilateral airspace disease which may be from edema or infection. Small bilateral effusions.      < end of copied text >    MEDICATIONS  (STANDING):  atorvastatin 20 milliGRAM(s) Oral at bedtime  citalopram 20 milliGRAM(s) Oral daily  dextrose 5%. 1000 milliLiter(s) (50 mL/Hr) IV Continuous <Continuous>  dextrose 50% Injectable 12.5 Gram(s) IV Push once  dextrose 50% Injectable 25 Gram(s) IV Push once  dextrose 50% Injectable 25 Gram(s) IV Push once  finasteride 5 milliGRAM(s) Oral daily  folic acid 1 milliGRAM(s) Oral daily  furosemide    Tablet 80 milliGRAM(s) Oral every 24 hours  gabapentin 600 milliGRAM(s) Oral two times a day  insulin lispro (HumaLOG) corrective regimen sliding scale   SubCutaneous three times a day before meals  insulin lispro (HumaLOG) corrective regimen sliding scale   SubCutaneous at bedtime  melatonin 3 milliGRAM(s) Oral at bedtime  sodium chloride 0.9% lock flush 3 milliLiter(s) IV Push every 8 hours  tamsulosin 0.4 milliGRAM(s) Oral at bedtime  warfarin 2.5 milliGRAM(s) Oral once      A/P:  Mr. Benitez is a pleasant 89 y/o M w/ PMH of HTN, HLD, BPH, DMII and sick sinus syndrome s/p elective PPM.   New device pacing and sensing well.      1)      Sick sinus syndrome -  s/p PPM             New device Pacing and sensing well.             May shower in AM.            Post procedure device care provided and written instructions provided.             Patient advised to be cautious with heavy lifting and arm activities as this may compromise the new implanted 	device            Discharge home today.             Follow up in device clinic in 2 weeks     2)       INR 1.6 - Dose coumadin             F/u with cardiologist to check INR and lytes in 2 days   3) Continue home meds including insulin S/p PPM by Shavonne Gonzalez   S/24 Events: Patient seen and examined. Denies CP, SOB, dizziness, LH, near syncope or sycope.   No acute events overnight.   INR 1.6    Telemetry : -AV sequential pacing 60's. No events overnight   O:  T(C): 36.7 (02-28-20 @ 11:14), Max: 36.7 (02-28-20 @ 11:14)  HR: 76 (02-28-20 @ 11:14) (66 - 81)  BP: 138/68 (02-28-20 @ 11:14) (137/78 - 146/79)  RR: 17 (02-28-20 @ 11:14) (16 - 17)  SpO2: 100% (02-28-20 @ 11:14) (90% - 100%)  Wt(kg): --  Daily     Daily   Gen: NAD  HEENT: EOMI  CV: RRR, normal S1 + S2, no m/r/g  Lungs: CTAB  Abd: soft, non-tender, obese abdomen   Ext: 2+ edema    Labs:                        9.5    4.84  )-----------( 163      ( 28 Feb 2020 06:50 )             32.0     02-28    135  |  97<L>  |  49<H>  ----------------------------<  144<H>  4.7   |  27  |  1.76<H>    Ca    8.9      28 Feb 2020 06:50  Phos  3.0     02-28  Mg     2.2     02-28      PT/INR - ( 28 Feb 2020 06:50 )   PT: 19.5 SEC;   INR: 1.68          CXR - < from: Xray Chest 1 View-PORTABLE IMMEDIATE (02.27.20 @ 18:16) >  Small bilateral pleural effusions. Patchy bilateral airspace disease, right greater than left noted. No pneumothorax.   Cardiac size cannot be accurately assessed in this projection, however appears enlarged. No acute osseous abnormality.    IMPRESSION:   No pneumothorax.     Patchy bilateral airspace disease which may be from edema or infection. Small bilateral effusions.      < end of copied text >    MEDICATIONS  (STANDING):  atorvastatin 20 milliGRAM(s) Oral at bedtime  citalopram 20 milliGRAM(s) Oral daily  dextrose 5%. 1000 milliLiter(s) (50 mL/Hr) IV Continuous <Continuous>  dextrose 50% Injectable 12.5 Gram(s) IV Push once  dextrose 50% Injectable 25 Gram(s) IV Push once  dextrose 50% Injectable 25 Gram(s) IV Push once  finasteride 5 milliGRAM(s) Oral daily  folic acid 1 milliGRAM(s) Oral daily  furosemide    Tablet 80 milliGRAM(s) Oral every 24 hours  gabapentin 600 milliGRAM(s) Oral two times a day  insulin lispro (HumaLOG) corrective regimen sliding scale   SubCutaneous three times a day before meals  insulin lispro (HumaLOG) corrective regimen sliding scale   SubCutaneous at bedtime  melatonin 3 milliGRAM(s) Oral at bedtime  sodium chloride 0.9% lock flush 3 milliLiter(s) IV Push every 8 hours  tamsulosin 0.4 milliGRAM(s) Oral at bedtime  warfarin 2.5 milliGRAM(s) Oral once      A/P:  Mr. Benitez is a pleasant 89 y/o M w/ PMH of HTN, HLD, BPH, DMII and sick sinus syndrome s/p elective PPM.   New device pacing and sensing well.      1)      Sick sinus syndrome -  s/p PPM             New device Pacing and sensing well.             May shower in AM.            Post procedure device care provided and written instructions provided.             Patient advised to be cautious with heavy lifting and arm activities as this may compromise the new implanted 	device            Discharge home today.             Follow up in device clinic in 2 weeks ( 3/17/20 @ 2:15 pm ).     2)       INR 1.6 - Dose coumadin             F/u with cardiologist to check INR and lytes in 2 days   3) Continue home meds including insulin

## 2020-02-28 NOTE — DISCHARGE NOTE PROVIDER - NSDCCPCAREPLAN_GEN_ALL_CORE_FT
PRINCIPAL DISCHARGE DIAGNOSIS  Diagnosis: Sick sinus syndrome  Assessment and Plan of Treatment: Sick sinus syndrome is a heart rhythm problems in which the heart's natural pacemaker (sinus node) doesn't work properly. You had a pacemaker implanted to help regulate your heart rate if need be.   Please be cautious with heavy lifting and arm activities as this may compromise the new implanted device.  Please follow up in device clinic in 2 weeks ( 3/17/20 @ 2:15 pm ).  Please also call to arrange follow up with your primary cardiologist for within 1 week after discharge.      SECONDARY DISCHARGE DIAGNOSES  Diagnosis: Paroxysmal atrial fibrillation  Assessment and Plan of Treatment: Please continue your medications as prescribed. Continue your home dose of coumadin and get your INR checked on monday 3/2/20 when you go to see your primary care doctor as arranged. PRINCIPAL DISCHARGE DIAGNOSIS  Diagnosis: Sick sinus syndrome  Assessment and Plan of Treatment: Sick sinus syndrome is a heart rhythm problems in which the heart's natural pacemaker (sinus node) doesn't work properly. You had a pacemaker implanted to help regulate your heart rate if need be. No showering until the morning 2/29. Please be cautious with heavy lifting and arm activities as this may compromise the new implanted device.  Please follow up in device clinic in 2 weeks ( 3/17/20 @ 2:15 pm ).  Please also call to arrange follow up with your primary cardiologist for within 1 week after discharge.      SECONDARY DISCHARGE DIAGNOSES  Diagnosis: Paroxysmal atrial fibrillation  Assessment and Plan of Treatment: Please continue your medications as prescribed. Continue your home dose of coumadin and get your INR checked on monday 3/2/20 when you go to see your primary care doctor as arranged.

## 2020-02-28 NOTE — DISCHARGE NOTE PROVIDER - NSDCFUADDINST_GEN_ALL_CORE_FT
Please refer to the detailed post procedure directions that were provided to you by the EP team on discharge.

## 2020-02-28 NOTE — DISCHARGE NOTE NURSING/CASE MANAGEMENT/SOCIAL WORK - NSDCFUADDAPPT_GEN_ALL_CORE_FT
Please see your primary doctor Dr. Detweiler ([phone]) as arranged on Monday 3/2/20 at 12:30pm and have your blood drawn to get your INR and electrolytes checked.    Please call to arrange follow up appointment with your primary cardiologist for within 1 week after discharge.    Please follow up with for device check at [phone] [place] [address], 4th floor, cardiology clinic, [place], [place], [address] ([phone]) as scheduled for 3/17/2020.

## 2020-02-28 NOTE — DISCHARGE NOTE PROVIDER - NSDCFUADDAPPT_GEN_ALL_CORE_FT
Please see your primary doctor as arranged on Monday 3/2/20 at 12:30pm.     Please call to arrange follow up appointment with your primary cardiologist for within 1 week after discharge.    Please follow up with for device check as scheduled for 3/17/2020. Please see your primary doctor Dr. Detweiler ([phone]) as arranged on Monday 3/2/20 at 12:30pm.     Please call to arrange follow up appointment with your primary cardiologist for within 1 week after discharge.    Please follow up with for device check at [phone] [place] [address], 4th floor, cardiology clinic, [place], [place], [address] ([phone]) as scheduled for 3/17/2020. Please see your primary doctor Dr. Detweiler ([phone]) as arranged on Monday 3/2/20 at 12:30pm and have your blood drawn to get your INR and electrolytes checked.    Please call to arrange follow up appointment with your primary cardiologist for within 1 week after discharge.    Please follow up with for device check at [phone] [place] [address], 4th floor, cardiology clinic, [place], [address] ([phone]) as scheduled for 3/17/2020.

## 2020-02-28 NOTE — DISCHARGE NOTE PROVIDER - NSDCHC_MEDRECSTATUS_GEN_ALL_CORE
Admission Reconciliation is Completed  Discharge Reconciliation is Not Complete Admission Reconciliation is Not Complete  Discharge Reconciliation is Completed Admission Reconciliation is Completed  Discharge Reconciliation is Completed

## 2020-02-28 NOTE — DISCHARGE NOTE PROVIDER - HOSPITAL COURSE
89 y/o M w/ PMH of HTN, paroxysmal Afib, HLD, BPH, DMII and sick sinus syndrome presents for elective PPM, now s/p PPM placement 2/27.        Please     1)      Sick sinus syndrome -  s/p PPM 2/27              New device Pacing and sensing well.               May shower in AM.              Post procedure device care provided and written instructions provided.               Patient advised to be cautious with heavy lifting and arm activities as this may compromise the new implanted 	device              Discharge home today.               Follow up in device clinic in 2 weeks ( 3/17/20 @ 2:15 pm ).         2)       INR 1.6 - Dose coumadin               F/u with cardiologist to check INR and lytes in 2 days     3) Continue home meds including insulin 89 y/o M w/ PMH of HTN, paroxysmal Afib, HLD, BPH, DMII and sick sinus syndrome presents for elective PPM, now s/p PPM placement 2/27.        Sick sinus syndrome: Per EP, new device pacing and sensing well.     Per EP, may shower in AM. Post procedure device care provided and written instructions provided. Patient advised to be cautious with heavy lifting and arm activities as this may compromise the new implanted 	device. Follow up in device clinic in 2 weeks ( 3/17/20 @ 2:15 pm ).         Post procedure CXR showed: No pneumothorax. Patchy bilateral airspace disease which may be from edema or infection. Small bilateral effusions.    Patient has no signs of infection, WBC normal, afebrile, no respiratory complaints.     Continue diuresis with lasix 80mg PO daily.        Paroxysmal Afib: INR 1.6 - Dose coumadin per home schedule    Primary care visit arranged for Monday 3/2/20 at 12:30pm to check INR and electrolytes.    Follow up with primary cardiologist next week.            DM: Continue home meds including insulin

## 2020-02-28 NOTE — DISCHARGE NOTE NURSING/CASE MANAGEMENT/SOCIAL WORK - PATIENT PORTAL LINK FT
You can access the FollowMyHealth Patient Portal offered by [facility] by registering at the following website: [email or web address]. By joining [facility]’s FollowMyHealth portal, you will also be able to view your health information using other applications (apps) compatible with our system.

## 2020-02-28 NOTE — CHART NOTE - NSCHARTNOTEFT_GEN_A_CORE
Post PPM results noted: No pneumothorax. Patchy bilateral airspace disease which may be from edema or infection. Small bilateral effusions.    CXR results reviewed with EP team: given no signs of infection, normal WBC, no fever - no change in management. Continue with lasix PO for diuresis on discharge.     Patient is currently stable and is cleared for discharge per EP team.

## 2020-02-28 NOTE — DISCHARGE NOTE PROVIDER - NSDCMRMEDTOKEN_GEN_ALL_CORE_FT
ALPRAZolam 0.5 mg oral tablet: 1 tab(s) orally 3 times a day, As Needed  atorvastatin 20 mg oral tablet: 1 tab(s) orally once a day  citalopram 40 mg oral tablet: 0.5 tab(s) orally once a day  finasteride 5 mg oral tablet: 1 tab(s) orally once a day  folic acid 1 mg oral tablet: 1 tab(s) orally once a day  furosemide 80 mg oral tablet: 1 tab(s) orally once a day  gabapentin 600 mg oral tablet: 1 tab(s) orally 2 times a day  glipiZIDE 5 mg oral tablet: 1 tab(s) orally once a day  pioglitazone 15 mg oral tablet: 1 tab(s) orally once a day  tamsulosin 0.4 mg oral capsule: 1 cap(s) orally once a day  Vitamin B12 100 mcg oral tablet: 1 tab(s) orally once a day  Vitamin D3 2000 intl units (50 mcg) oral tablet: 1 tab(s) orally once a day  warfarin 5 mg oral tablet: 0.5 tab(s) orally once a day, Friday to Sunday  warfarin 5 mg oral tablet: 1 tab(s) orally once a day, Monday to Thursday ALPRAZolam 0.5 mg oral tablet: 1 tab(s) orally 3 times a day, As Needed  atorvastatin 20 mg oral tablet: 1 tab(s) orally once a day  citalopram 40 mg oral tablet: 0.5 tab(s) orally once a day  finasteride 5 mg oral tablet: 1 tab(s) orally once a day  folic acid 1 mg oral tablet: 1 tab(s) orally once a day  Follow up labs: coags, electrolytes on Monday 3/2/20: Follow up labs: coags, electrolytes on Monday 3/2/20  furosemide 80 mg oral tablet: 1 tab(s) orally once a day  gabapentin 600 mg oral tablet: 1 tab(s) orally 2 times a day  glipiZIDE 5 mg oral tablet: 1 tab(s) orally once a day  pioglitazone 15 mg oral tablet: 1 tab(s) orally once a day  tamsulosin 0.4 mg oral capsule: 1 cap(s) orally once a day  Vitamin B12 100 mcg oral tablet: 1 tab(s) orally once a day  Vitamin D3 2000 intl units (50 mcg) oral tablet: 1 tab(s) orally once a day  warfarin 5 mg oral tablet: 0.5 tab(s) orally once a day, Friday to Sunday  warfarin 5 mg oral tablet: 1 tab(s) orally once a day, Monday to Thursday

## 2020-05-13 ENCOUNTER — APPOINTMENT (OUTPATIENT)
Dept: CARDIOLOGY | Facility: CLINIC | Age: 85
End: 2020-05-13
Payer: MEDICARE

## 2020-05-13 DIAGNOSIS — R06.00 DYSPNEA, UNSPECIFIED: ICD-10-CM

## 2020-05-13 PROCEDURE — 99215 OFFICE O/P EST HI 40 MIN: CPT | Mod: 25,95

## 2020-05-13 NOTE — HISTORY OF PRESENT ILLNESS
[Medical Office: ([facility])___] : at the medical office located in  [Home] : at home, [unfilled] , at the time of the visit. [Family Member] : family member [Patient] : the patient [FreeTextEntry1] : Saud is 89 years of age with hypertension, atrial fibrillation, obesity, dyslipidemia, sick sinus syndrome s/p Medtronic PPM with improvement in symptoms. Lately, leg/ankle edema and Shortness of breath noted. Eating generally healthy and he tried to limit fluid and salt intake. He is accompanied by his adult dtr. Takes his medications regularly. Showed me his PPM site which looks well-healed. Pitting edema of legs noted. Recent new labs pending.\par

## 2020-05-13 NOTE — DISCUSSION/SUMMARY
[___ Month(s)] : [unfilled] month(s) [FreeTextEntry1] : He will continue on his current antihypertensive medication regimen including amlodipine as well as glycemic lowering medications for diabetic management. He will continue on his atorvastatin for lipid-lowering. He said he take Lasix 80 mg on the morning and Lasix 40 mg in the early afternoon for 2 days and if this is helpful to be continued. Otherwise Lasix 80 mg b.i.d. could be considered for about a week time and reevaluate with a phone call. Fluid and sodium restriction encouraged. Her BP pacemaker followup will be done remotely for the time being. I recommended a heart healthy lifestyle including a low-saturated fat, low cholesterol diet with improved aerobic physical fitness over time for cardiovascular benefits.

## 2020-05-13 NOTE — REASON FOR VISIT
[Follow-Up - Clinic] : a clinic follow-up of [Atrial Fibrillation] : atrial fibrillation [Hyperlipidemia] : hyperlipidemia [Dyspnea] : dyspnea [Hypertension] : hypertension

## 2020-06-11 PROBLEM — E78.5 HYPERLIPIDEMIA, UNSPECIFIED: Chronic | Status: ACTIVE | Noted: 2020-02-27

## 2020-06-11 PROBLEM — R00.1 BRADYCARDIA, UNSPECIFIED: Chronic | Status: ACTIVE | Noted: 2020-02-27

## 2020-06-11 PROBLEM — I10 ESSENTIAL (PRIMARY) HYPERTENSION: Chronic | Status: ACTIVE | Noted: 2020-02-27

## 2020-06-11 PROBLEM — N40.0 BENIGN PROSTATIC HYPERPLASIA WITHOUT LOWER URINARY TRACT SYMPTOMS: Chronic | Status: ACTIVE | Noted: 2020-02-27

## 2020-06-11 PROBLEM — I49.5 SICK SINUS SYNDROME: Chronic | Status: ACTIVE | Noted: 2020-02-27

## 2020-06-15 ENCOUNTER — APPOINTMENT (OUTPATIENT)
Dept: CARDIOLOGY | Facility: CLINIC | Age: 85
End: 2020-06-15
Payer: MEDICARE

## 2020-06-15 ENCOUNTER — NON-APPOINTMENT (OUTPATIENT)
Age: 85
End: 2020-06-15

## 2020-06-15 VITALS
WEIGHT: 262 LBS | DIASTOLIC BLOOD PRESSURE: 80 MMHG | HEART RATE: 79 BPM | SYSTOLIC BLOOD PRESSURE: 138 MMHG | HEIGHT: 67 IN | TEMPERATURE: 98 F | OXYGEN SATURATION: 96 % | BODY MASS INDEX: 41.12 KG/M2

## 2020-06-15 PROCEDURE — 93000 ELECTROCARDIOGRAM COMPLETE: CPT

## 2020-06-15 PROCEDURE — 99215 OFFICE O/P EST HI 40 MIN: CPT

## 2020-06-15 RX ORDER — METOLAZONE 5 MG/1
5 TABLET ORAL
Qty: 10 | Refills: 3 | Status: ACTIVE | COMMUNITY
Start: 2020-06-15 | End: 1900-01-01

## 2020-06-15 RX ORDER — METOLAZONE 5 MG/1
5 TABLET ORAL
Qty: 90 | Refills: 3 | Status: ACTIVE | COMMUNITY
Start: 2020-06-15 | End: 1900-01-01

## 2020-06-15 NOTE — REASON FOR VISIT
[Follow-Up - Clinic] : a clinic follow-up of [Atrial Fibrillation] : atrial fibrillation [Hypertension] : hypertension [Dyspnea] : dyspnea [Hyperlipidemia] : hyperlipidemia

## 2020-06-16 NOTE — REVIEW OF SYSTEMS
[Recent Weight Gain (___ Lbs)] : recent [unfilled] ~Ulb weight gain [Dizziness] : dizziness [Negative] : Psychiatric [Shortness Of Breath] : shortness of breath [Lower Ext Edema] : lower extremity edema

## 2020-06-16 NOTE — DISCUSSION/SUMMARY
[___ Week(s)] : [unfilled] week(s) [FreeTextEntry1] : I will add metolazone 5 mg in the morning half-hour before a.m. Lasix 80 mg daily for the next week and we'll see if this helps offset further ankle edema and leg edema as well as shortness of breath. He will continue atorvastatin for lipid-lowering e extensive medications amlodipine as well as warfarin for stroke risk reduction plus his glycemic own medications for diabetic management. Fluid and sodium restriction encouraged with his adult daughter in the room.I recommended a heart healthy lifestyle including a low-saturated fat, low cholesterol diet with improved aerobic physical fitness over time for cardiovascular benefits.Carbohydrate and sodium restriction along with weight loss over time encouraged. He can assume checking his pacemaker in our office for routine surveillance purposes. He'll have his pacemaker checked routinely in follow up with me in one month or sooner if needed.

## 2020-06-16 NOTE — HISTORY OF PRESENT ILLNESS
[FreeTextEntry1] : Saud is a 89-year-old with obesity by BMI, dyslipidemia, hypertension, atrial fibrillation and sick sinus syndrome status post pacemaker who has chronic lower extremity edema with intermittent dose adjustment of his Lasix however his edema of his legs has become worse as well as shortness of breath on minimal exertion.

## 2020-06-16 NOTE — PHYSICAL EXAM
[General Appearance - Well Developed] : well developed [Normal Appearance] : normal appearance [General Appearance - Well Nourished] : well nourished [No Deformities] : no deformities [Well Groomed] : well groomed [General Appearance - In No Acute Distress] : no acute distress [Normal Conjunctiva] : the conjunctiva exhibited no abnormalities [Eyelids - No Xanthelasma] : the eyelids demonstrated no xanthelasmas [Normal Oral Mucosa] : normal oral mucosa [No Oral Pallor] : no oral pallor [No Oral Cyanosis] : no oral cyanosis [Normal Jugular Venous A Waves Present] : normal jugular venous A waves present [Normal Jugular Venous V Waves Present] : normal jugular venous V waves present [No Jugular Venous Etienne A Waves] : no jugular venous etienne A waves [Respiration, Rhythm And Depth] : normal respiratory rhythm and effort [Auscultation Breath Sounds / Voice Sounds] : lungs were clear to auscultation bilaterally [Exaggerated Use Of Accessory Muscles For Inspiration] : no accessory muscle use [Heart Sounds] : normal S1 and S2 [Murmurs] : no murmurs present [Bradycardic ___] : the heart rate was bradycardic at [unfilled] bpm [Irregularly Irregular] : the rhythm was irregularly irregular [1+] : left 1+ [Bowel Sounds] : normal bowel sounds [Abdomen Tenderness] : non-tender [Abdomen Soft] : soft [Abdomen Mass (___ Cm)] : no abdominal mass palpated [Nail Clubbing] : no clubbing of the fingernails [Cyanosis, Localized] : no localized cyanosis [Petechial Hemorrhages (___cm)] : no petechial hemorrhages [Skin Color & Pigmentation] : normal skin color and pigmentation [] : no rash [No Skin Ulcers] : no skin ulcer [No Xanthoma] : no  xanthoma was observed [Oriented To Time, Place, And Person] : oriented to person, place, and time [Mood] : the mood was normal [Affect] : the affect was normal [No Anxiety] : not feeling anxious [Right Carotid Bruit] : no bruit heard over the right carotid [Left Carotid Bruit] : no bruit heard over the left carotid [Bruit] : no bruit heard [FreeTextEntry1] : Walks with a cane

## 2020-06-25 ENCOUNTER — APPOINTMENT (OUTPATIENT)
Dept: CARDIOLOGY | Facility: CLINIC | Age: 85
End: 2020-06-25
Payer: MEDICARE

## 2020-06-25 DIAGNOSIS — Z45.018 ENCOUNTER FOR ADJUSTMENT AND MANAGEMENT OF OTHER PART OF CARDIAC PACEMAKER: ICD-10-CM

## 2020-06-25 PROCEDURE — 93280 PM DEVICE PROGR EVAL DUAL: CPT

## 2020-06-25 NOTE — PROCEDURE
[No] : not [NSR] : normal sinus rhythm [Voltage: ___ volts] : Voltage was [unfilled] volts [Longevity: ___ months] : The estimated remaining battery life is [unfilled] months [Threshold Testing Performed] : Threshold testing was performed [Lead Imp:  ___ohms] : lead impedance was [unfilled] ohms [Sensing Amplitude ___mv] : sensing amplitude was [unfilled] mv [___V @] : [unfilled] V [___ ms] : [unfilled] ms [None] : none [Counters Reset] : the counters were reset [Apace-Vpace ___ %] : Apace-Vpace [unfilled]% [de-identified] : medtronic [de-identified] : Karyn GARVIN DR MRI [id number] [de-identified] : [id number] [de-identified] : 2/27/20 [de-identified] : AAIR<=>DDDR [de-identified] : 60

## 2020-06-25 NOTE — REASON FOR VISIT
[Follow-up Device Check] : follow-up device check visit [QUENTIN (Elective Replacement Indication)] : elective replacement indication

## 2020-06-25 NOTE — DISCUSSION/SUMMARY
[Pacemaker Function Normal] : normal pacemaker function [Routine Follow-up in 3-4 months] : routine follow-up in 3-4 months

## 2020-07-06 ENCOUNTER — APPOINTMENT (OUTPATIENT)
Dept: ELECTROPHYSIOLOGY | Facility: CLINIC | Age: 85
End: 2020-07-06

## 2020-07-07 ENCOUNTER — APPOINTMENT (OUTPATIENT)
Dept: ELECTROPHYSIOLOGY | Facility: CLINIC | Age: 85
End: 2020-07-07
Payer: MEDICARE

## 2020-07-07 PROCEDURE — 93294 REM INTERROG EVL PM/LDLS PM: CPT

## 2020-07-07 PROCEDURE — 93296 REM INTERROG EVL PM/IDS: CPT

## 2020-07-15 ENCOUNTER — APPOINTMENT (OUTPATIENT)
Dept: CARDIOLOGY | Facility: CLINIC | Age: 85
End: 2020-07-15
Payer: MEDICARE

## 2020-07-15 ENCOUNTER — NON-APPOINTMENT (OUTPATIENT)
Age: 85
End: 2020-07-15

## 2020-07-15 VITALS
TEMPERATURE: 98.2 F | OXYGEN SATURATION: 96 % | SYSTOLIC BLOOD PRESSURE: 140 MMHG | HEART RATE: 65 BPM | BODY MASS INDEX: 38.61 KG/M2 | WEIGHT: 246 LBS | DIASTOLIC BLOOD PRESSURE: 70 MMHG | HEIGHT: 67 IN

## 2020-07-15 PROCEDURE — 99215 OFFICE O/P EST HI 40 MIN: CPT

## 2020-07-15 PROCEDURE — 93000 ELECTROCARDIOGRAM COMPLETE: CPT

## 2020-07-15 RX ORDER — ATORVASTATIN CALCIUM 20 MG/1
20 TABLET, FILM COATED ORAL DAILY
Refills: 0 | Status: DISCONTINUED | COMMUNITY
End: 2020-07-15

## 2020-07-15 RX ORDER — SITAGLIPTIN 50 MG/1
50 TABLET, FILM COATED ORAL DAILY
Refills: 0 | Status: DISCONTINUED | COMMUNITY
End: 2020-07-15

## 2020-07-15 RX ORDER — FINASTERIDE 5 MG/1
5 TABLET, FILM COATED ORAL DAILY
Refills: 0 | Status: DISCONTINUED | COMMUNITY
End: 2020-07-15

## 2020-07-15 RX ORDER — PIOGLITAZONE HYDROCHLORIDE 15 MG/1
15 TABLET ORAL
Refills: 0 | Status: ACTIVE | COMMUNITY

## 2020-07-15 NOTE — DISCUSSION/SUMMARY
[___ Week(s)] : [unfilled] week(s) [FreeTextEntry1] : Continue on his current antihypertensive and glycemic medication strategy. Stay on warfarin for stroke risk reduction is underlying atrial fibrillation. He can stop his metolazone now continue Lasix 80 mg daily. Showed edema and shortness of breath worsened, he could take his metolazone as directed and as needed. I understand he isn't having blood work for surveillance labs. Please send labs over when available.I recommended a heart healthy lifestyle including a low-saturated fat, low cholesterol diet with improved aerobic physical fitness over time for cardiovascular benefits.Carbohydrate and sodium restriction along with weight loss over time encouraged. Followup in 3 months sooner if needed.

## 2020-07-15 NOTE — PHYSICAL EXAM
[General Appearance - Well Developed] : well developed [Well Groomed] : well groomed [General Appearance - Well Nourished] : well nourished [Normal Appearance] : normal appearance [Normal Conjunctiva] : the conjunctiva exhibited no abnormalities [General Appearance - In No Acute Distress] : no acute distress [No Deformities] : no deformities [Eyelids - No Xanthelasma] : the eyelids demonstrated no xanthelasmas [Normal Oral Mucosa] : normal oral mucosa [No Oral Pallor] : no oral pallor [No Oral Cyanosis] : no oral cyanosis [Normal Jugular Venous A Waves Present] : normal jugular venous A waves present [No Jugular Venous Etienne A Waves] : no jugular venous etienne A waves [Normal Jugular Venous V Waves Present] : normal jugular venous V waves present [Exaggerated Use Of Accessory Muscles For Inspiration] : no accessory muscle use [Respiration, Rhythm And Depth] : normal respiratory rhythm and effort [Auscultation Breath Sounds / Voice Sounds] : lungs were clear to auscultation bilaterally [Heart Sounds] : normal S1 and S2 [Murmurs] : no murmurs present [Bradycardic ___] : the heart rate was bradycardic at [unfilled] bpm [Irregularly Irregular] : the rhythm was irregularly irregular [1+] : left 1+ [Bowel Sounds] : normal bowel sounds [Abdomen Tenderness] : non-tender [Abdomen Soft] : soft [Abdomen Mass (___ Cm)] : no abdominal mass palpated [Nail Clubbing] : no clubbing of the fingernails [Cyanosis, Localized] : no localized cyanosis [Petechial Hemorrhages (___cm)] : no petechial hemorrhages [Skin Color & Pigmentation] : normal skin color and pigmentation [No Skin Ulcers] : no skin ulcer [] : no rash [No Xanthoma] : no  xanthoma was observed [Oriented To Time, Place, And Person] : oriented to person, place, and time [Affect] : the affect was normal [Mood] : the mood was normal [No Anxiety] : not feeling anxious [Right Carotid Bruit] : no bruit heard over the right carotid [Left Carotid Bruit] : no bruit heard over the left carotid [Bruit] : no bruit heard [FreeTextEntry1] : + 2 bilateral ankle Edema

## 2020-07-15 NOTE — HISTORY OF PRESENT ILLNESS
[FreeTextEntry1] : Saud is 89 years old with atrial fibrillation and sick sinus syndrome with pacemaker, dyslipidemia hypertension following up in the office antidiabetic therapy feeling much better lost about 16 pounds of weight and has reduction of ankle edema.\par \par

## 2020-07-15 NOTE — REVIEW OF SYSTEMS
[Recent Weight Gain (___ Lbs)] : recent [unfilled] ~Ulb weight gain [Shortness Of Breath] : shortness of breath [Lower Ext Edema] : lower extremity edema [Dizziness] : dizziness [Negative] : Heme/Lymph

## 2020-10-06 ENCOUNTER — APPOINTMENT (OUTPATIENT)
Dept: ELECTROPHYSIOLOGY | Facility: CLINIC | Age: 85
End: 2020-10-06
Payer: MEDICARE

## 2020-10-06 PROCEDURE — 93294 REM INTERROG EVL PM/LDLS PM: CPT

## 2020-10-06 PROCEDURE — 93296 REM INTERROG EVL PM/IDS: CPT

## 2020-10-21 ENCOUNTER — APPOINTMENT (OUTPATIENT)
Dept: CARDIOLOGY | Facility: CLINIC | Age: 85
End: 2020-10-21

## 2021-01-01 ENCOUNTER — RX RENEWAL (OUTPATIENT)
Age: 86
End: 2021-01-01

## 2021-01-01 ENCOUNTER — NON-APPOINTMENT (OUTPATIENT)
Age: 86
End: 2021-01-01

## 2021-01-01 ENCOUNTER — APPOINTMENT (OUTPATIENT)
Dept: ELECTROPHYSIOLOGY | Facility: CLINIC | Age: 86
End: 2021-01-01
Payer: MEDICARE

## 2021-01-01 ENCOUNTER — APPOINTMENT (OUTPATIENT)
Dept: CARDIOLOGY | Facility: CLINIC | Age: 86
End: 2021-01-01
Payer: MEDICARE

## 2021-01-01 VITALS
TEMPERATURE: 98 F | HEART RATE: 70 BPM | BODY MASS INDEX: 37.22 KG/M2 | DIASTOLIC BLOOD PRESSURE: 60 MMHG | WEIGHT: 260 LBS | HEIGHT: 70 IN | OXYGEN SATURATION: 95 % | SYSTOLIC BLOOD PRESSURE: 130 MMHG

## 2021-01-01 DIAGNOSIS — E78.5 HYPERLIPIDEMIA, UNSPECIFIED: ICD-10-CM

## 2021-01-01 DIAGNOSIS — E66.9 OBESITY, UNSPECIFIED: ICD-10-CM

## 2021-01-01 LAB — INR PPP: 1.9 RATIO

## 2021-01-01 PROCEDURE — 93000 ELECTROCARDIOGRAM COMPLETE: CPT

## 2021-01-01 PROCEDURE — 99214 OFFICE O/P EST MOD 30 MIN: CPT

## 2021-01-01 PROCEDURE — 93296 REM INTERROG EVL PM/IDS: CPT

## 2021-01-01 PROCEDURE — 93294 REM INTERROG EVL PM/LDLS PM: CPT

## 2021-01-01 RX ORDER — FUROSEMIDE 80 MG/1
80 TABLET ORAL
Qty: 180 | Refills: 3 | Status: ACTIVE | COMMUNITY
Start: 1900-01-01 | End: 1900-01-01

## 2021-01-05 ENCOUNTER — APPOINTMENT (OUTPATIENT)
Dept: ELECTROPHYSIOLOGY | Facility: CLINIC | Age: 86
End: 2021-01-05
Payer: MEDICARE

## 2021-01-05 PROCEDURE — 93296 REM INTERROG EVL PM/IDS: CPT

## 2021-01-05 PROCEDURE — 93294 REM INTERROG EVL PM/LDLS PM: CPT

## 2021-04-05 ENCOUNTER — APPOINTMENT (OUTPATIENT)
Dept: ELECTROPHYSIOLOGY | Facility: CLINIC | Age: 86
End: 2021-04-05
Payer: MEDICARE

## 2021-04-05 ENCOUNTER — NON-APPOINTMENT (OUTPATIENT)
Age: 86
End: 2021-04-05

## 2021-04-05 PROCEDURE — 93294 REM INTERROG EVL PM/LDLS PM: CPT

## 2021-04-05 PROCEDURE — 93296 REM INTERROG EVL PM/IDS: CPT

## 2021-07-06 ENCOUNTER — NON-APPOINTMENT (OUTPATIENT)
Age: 86
End: 2021-07-06

## 2021-07-06 ENCOUNTER — APPOINTMENT (OUTPATIENT)
Dept: ELECTROPHYSIOLOGY | Facility: CLINIC | Age: 86
End: 2021-07-06
Payer: MEDICARE

## 2021-07-06 PROCEDURE — 93296 REM INTERROG EVL PM/IDS: CPT

## 2021-07-06 PROCEDURE — 93294 REM INTERROG EVL PM/LDLS PM: CPT

## 2021-07-07 ENCOUNTER — RX RENEWAL (OUTPATIENT)
Age: 86
End: 2021-07-07

## 2021-08-11 PROBLEM — E66.9 MILD OBESITY: Status: RESOLVED | Noted: 2017-08-10 | Resolved: 2021-01-01

## 2021-08-11 PROBLEM — E78.5 HYPERLIPIDEMIA, UNSPECIFIED HYPERLIPIDEMIA TYPE: Status: ACTIVE | Noted: 2020-02-14

## 2021-08-11 NOTE — REASON FOR VISIT
[Follow-Up - Clinic] : a clinic follow-up of [Atrial Fibrillation] : atrial fibrillation [Dyspnea] : dyspnea [Hyperlipidemia] : hyperlipidemia [Symptom and Test Evaluation] : symptom and test evaluation [Arrhythmia/ECG Abnorrmalities] : arrhythmia/ECG abnormalities [Hypertension] : hypertension

## 2021-08-11 NOTE — DISCUSSION/SUMMARY
[___ Week(s)] : in [unfilled] week(s) [FreeTextEntry1] : He will stay on warfarin for stroke risk reduction in the setting of atrial fibrillation.  Follow-up pacemaker checks both remote and in person.  He will stay on his current antihypertensive regimen of amlodipine.  I have intensified Lasix 120 mg in the morning and metolazone 5 mg in the morning and half hour before Lasix.  If this helps over a while he could further lower her Lasix back to 80 mg daily and continue metolazone as well.  Avoidance of NSAIDs and care with overdiuresis and also explained given renal insufficiency.  Continue glycemic management in the setting of type 2 diabetes to continue.  No vascular intervention is planned at this time given age and comorbidities.  I have also taken liberty of prescribing Crestor 10 mg daily given evidence of dyslipidemia on recent labs.  I recommended a heart healthy lifestyle including a low-saturated fat, low cholesterol diet with improved age-appropriate and as tolerated aerobic physical fitness over time for cardiovascular benefits.  Carbohydrate and sodium restriction along with weight loss over time encouraged.  Follow-up with you for care and see me in 6 months or sooner if needed.

## 2021-08-11 NOTE — HISTORY OF PRESENT ILLNESS
[FreeTextEntry1] : Saud is a pleasant 89-year-old with hypertension, dyslipidemia, longstanding atrial fibrillation status post pacemaker, obesity by BMI, chronic ankle edema and recent finding of abdominal aortic aneurysm.  I reviewed test findings with both he and his adult daughter present today.  He is opting for no surgical intervention on his abdominal aneurysm.  His daughter agrees.  No current chest pains, palpitations, syncope noted.  Diuretics with some improvement in edema but not much he says.  INR today is 1.9 on his current warfarin therapy.\par

## 2021-08-11 NOTE — CARDIOLOGY SUMMARY
[de-identified] : Atrial fibrillation with controlled ventricular response. Low voltage in precordial leads.   -Incomplete right bundle branch block.   ABNORMAL

## 2021-08-11 NOTE — PHYSICAL EXAM
[Well Developed] : well developed [Well Nourished] : well nourished [No Acute Distress] : no acute distress [Normal Conjunctiva] : normal conjunctiva [Normal Venous Pressure] : normal venous pressure [No Carotid Bruit] : no carotid bruit [Normal S1, S2] : normal S1, S2 [No Rub] : no rub [No Gallop] : no gallop [Normal Rate] : normal [Irregularly Irregular] : irregularly irregular [1+] : left 1+ [Clear Lung Fields] : clear lung fields [Good Air Entry] : good air entry [No Respiratory Distress] : no respiratory distress  [Soft] : abdomen soft [Non Tender] : non-tender [No Masses/organomegaly] : no masses/organomegaly [Normal Bowel Sounds] : normal bowel sounds [Normal Gait] : normal gait [No Edema] : no edema [No Cyanosis] : no cyanosis [No Clubbing] : no clubbing [No Varicosities] : no varicosities [No Rash] : no rash [No Skin Lesions] : no skin lesions [Moves all extremities] : moves all extremities [No Focal Deficits] : no focal deficits [Normal Speech] : normal speech [Alert and Oriented] : alert and oriented [Normal memory] : normal memory [Right Carotid Bruit] : no bruit heard over the right carotid [Left Carotid Bruit] : no bruit heard over the left carotid

## 2022-01-01 ENCOUNTER — NON-APPOINTMENT (OUTPATIENT)
Age: 87
End: 2022-01-01

## 2022-01-01 ENCOUNTER — APPOINTMENT (OUTPATIENT)
Dept: ELECTROPHYSIOLOGY | Facility: CLINIC | Age: 87
End: 2022-01-01

## 2022-01-01 ENCOUNTER — APPOINTMENT (OUTPATIENT)
Dept: ELECTROPHYSIOLOGY | Facility: CLINIC | Age: 87
End: 2022-01-01
Payer: MEDICARE

## 2022-01-01 ENCOUNTER — APPOINTMENT (OUTPATIENT)
Dept: CARDIOLOGY | Facility: CLINIC | Age: 87
End: 2022-01-01
Payer: MEDICARE

## 2022-01-01 ENCOUNTER — RX RENEWAL (OUTPATIENT)
Age: 87
End: 2022-01-01

## 2022-01-01 VITALS
DIASTOLIC BLOOD PRESSURE: 60 MMHG | HEART RATE: 60 BPM | TEMPERATURE: 97.8 F | WEIGHT: 238 LBS | BODY MASS INDEX: 34.07 KG/M2 | SYSTOLIC BLOOD PRESSURE: 104 MMHG | HEIGHT: 70 IN | OXYGEN SATURATION: 94 %

## 2022-01-01 DIAGNOSIS — Z86.79 PERSONAL HISTORY OF OTHER DISEASES OF THE CIRCULATORY SYSTEM: ICD-10-CM

## 2022-01-01 DIAGNOSIS — I10 ESSENTIAL (PRIMARY) HYPERTENSION: ICD-10-CM

## 2022-01-01 DIAGNOSIS — R60.0 LOCALIZED EDEMA: ICD-10-CM

## 2022-01-01 DIAGNOSIS — E11.9 TYPE 2 DIABETES MELLITUS W/OUT COMPLICATIONS: ICD-10-CM

## 2022-01-01 DIAGNOSIS — I50.20 UNSPECIFIED SYSTOLIC (CONGESTIVE) HEART FAILURE: ICD-10-CM

## 2022-01-01 PROCEDURE — 93000 ELECTROCARDIOGRAM COMPLETE: CPT

## 2022-01-01 PROCEDURE — 93296 REM INTERROG EVL PM/IDS: CPT

## 2022-01-01 PROCEDURE — 93294 REM INTERROG EVL PM/LDLS PM: CPT

## 2022-01-01 PROCEDURE — 99214 OFFICE O/P EST MOD 30 MIN: CPT

## 2022-01-01 RX ORDER — ROSUVASTATIN CALCIUM 10 MG/1
10 TABLET, FILM COATED ORAL
Qty: 90 | Refills: 3 | Status: ACTIVE | COMMUNITY
Start: 2021-01-01 | End: 1900-01-01

## 2022-01-01 RX ORDER — ALBUTEROL SULFATE 90 UG/1
108 (90 BASE) INHALANT RESPIRATORY (INHALATION)
Qty: 3 | Refills: 2 | Status: ACTIVE | COMMUNITY
Start: 2020-06-15 | End: 1900-01-01

## 2022-01-26 PROBLEM — E11.9 DIABETES: Status: ACTIVE | Noted: 2017-08-17

## 2022-01-26 PROBLEM — I50.20 HFREF (HEART FAILURE WITH REDUCED EJECTION FRACTION): Status: RESOLVED | Noted: 2020-05-13 | Resolved: 2022-01-01

## 2022-01-26 PROBLEM — I10 BENIGN ESSENTIAL HYPERTENSION: Status: ACTIVE | Noted: 2020-02-14

## 2022-01-26 PROBLEM — R60.0 BILATERAL LEG EDEMA: Status: ACTIVE | Noted: 2020-05-13

## 2022-01-26 PROBLEM — Z86.79 HISTORY OF ATRIAL FIBRILLATION: Status: RESOLVED | Noted: 2017-08-10 | Resolved: 2022-01-01

## 2022-01-26 NOTE — REASON FOR VISIT
[Symptom and Test Evaluation] : symptom and test evaluation [Arrhythmia/ECG Abnorrmalities] : arrhythmia/ECG abnormalities [Follow-Up - Clinic] : a clinic follow-up of [Atrial Fibrillation] : atrial fibrillation [Dyspnea] : dyspnea [Hyperlipidemia] : hyperlipidemia [Hypertension] : hypertension

## 2022-01-29 NOTE — CARDIOLOGY SUMMARY
[de-identified] : Electronic ventricular pacemaker  -possibly demand type  Pacemaker ECG, No further analysis

## 2022-01-29 NOTE — DISCUSSION/SUMMARY
[___ Week(s)] : in [unfilled] week(s) [FreeTextEntry1] : We will continue the current warfarin for atrial fibrillation related stroke risk reduction.  Continue on his dual diuretic therapy to help offset edema of his ankles and chronic heart failure preserved EF.  Continue his rosuvastatin for lipid lowering along with his glycemic lowering diabetic pills.  I renewed his albuterol today as well as his rosuvastatin.  I advised that he needs an updated pacemaker interrogation in person in our [place] office in follow-up in about 3 months or sooner if needed.  Age-appropriate activities and heart healthy diet also encouraged.

## 2022-01-29 NOTE — HISTORY OF PRESENT ILLNESS
[FreeTextEntry1] : I saw Saud today for cardiac follow-up.  As you remember, he is a pleasant 89-year-old with chronic atrial fibrillation, pacemaker, obesity, dyslipidemia and hypertension who lost several pounds and has less ankle edema without shortness of breath.  He reports that his wireless transmission of his pacemaker has been within acceptable limits without any alerts.  He is eating somewhat healthy.  Available labs reviewed.

## 2022-10-05 ENCOUNTER — APPOINTMENT (OUTPATIENT)
Dept: ELECTROPHYSIOLOGY | Facility: CLINIC | Age: 87
End: 2022-10-05